# Patient Record
Sex: FEMALE | Race: WHITE
[De-identification: names, ages, dates, MRNs, and addresses within clinical notes are randomized per-mention and may not be internally consistent; named-entity substitution may affect disease eponyms.]

---

## 2019-01-04 ENCOUNTER — HOSPITAL ENCOUNTER (EMERGENCY)
Dept: HOSPITAL 54 - ER | Age: 69
Discharge: HOME | End: 2019-01-04
Payer: MEDICARE

## 2019-01-04 VITALS — BODY MASS INDEX: 26.46 KG/M2 | WEIGHT: 155 LBS | HEIGHT: 64 IN

## 2019-01-04 VITALS — DIASTOLIC BLOOD PRESSURE: 78 MMHG | SYSTOLIC BLOOD PRESSURE: 126 MMHG

## 2019-01-04 DIAGNOSIS — G89.29: ICD-10-CM

## 2019-01-04 DIAGNOSIS — J21.9: Primary | ICD-10-CM
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
## 2019-01-04 DIAGNOSIS — L50.8: ICD-10-CM

## 2019-01-04 DIAGNOSIS — Z88.0: ICD-10-CM

## 2019-01-04 DIAGNOSIS — F10.10: ICD-10-CM

## 2019-01-04 DIAGNOSIS — J45.909: ICD-10-CM

## 2019-01-04 LAB
BASOPHILS # BLD AUTO: 0.1 /CMM (ref 0–0.2)
BASOPHILS NFR BLD AUTO: 1.2 % (ref 0–2)
BUN SERPL-MCNC: 11 MG/DL (ref 7–18)
CALCIUM SERPL-MCNC: 8.7 MG/DL (ref 8.5–10.1)
CHLORIDE SERPL-SCNC: 102 MMOL/L (ref 98–107)
CO2 SERPL-SCNC: 29 MMOL/L (ref 21–32)
CREAT SERPL-MCNC: 0.5 MG/DL (ref 0.6–1.3)
EOSINOPHIL NFR BLD AUTO: 0.1 % (ref 0–6)
GLUCOSE SERPL-MCNC: 91 MG/DL (ref 74–106)
HCT VFR BLD AUTO: 36 % (ref 33–45)
HGB BLD-MCNC: 12.2 G/DL (ref 11.5–14.8)
LYMPHOCYTES NFR BLD AUTO: 2.4 /CMM (ref 0.8–4.8)
LYMPHOCYTES NFR BLD AUTO: 21.3 % (ref 20–44)
MCHC RBC AUTO-ENTMCNC: 34 G/DL (ref 31–36)
MCV RBC AUTO: 83 FL (ref 82–100)
MONOCYTES NFR BLD AUTO: 0.6 /CMM (ref 0.1–1.3)
MONOCYTES NFR BLD AUTO: 5.1 % (ref 2–12)
NEUTROPHILS # BLD AUTO: 8.1 /CMM (ref 1.8–8.9)
NEUTROPHILS NFR BLD AUTO: 72.3 % (ref 43–81)
NT-PROBNP SERPL-MCNC: 1399 PG/ML (ref 0–125)
PLATELET # BLD AUTO: 285 /CMM (ref 150–450)
POTASSIUM SERPL-SCNC: 3.5 MMOL/L (ref 3.5–5.1)
RBC # BLD AUTO: 4.37 MIL/UL (ref 4–5.2)
SODIUM SERPL-SCNC: 138 MMOL/L (ref 136–145)
WBC NRBC COR # BLD AUTO: 11.2 K/UL (ref 4.3–11)

## 2019-01-04 NOTE — NUR
-------------------------------------------------------------------------------

           *** Note undone in EDM - 01/04/19 at 1534 by ELVIS ***            

-------------------------------------------------------------------------------

BIB FRIEND C/O WORSENING SOB ON EXERTION.  STATES SHE'S EXPERIENCED SIMILAR 
CONDITION OFF AND ON BEFORE.  PT IS AOX4, AMBULATORY, VSS, RR EVEN AND 
UNLABORED.  SKIN WARM, DRY, INTACT.  DENIES DIZZINESS, WEAKNESS, N/V.  NO ACUTE 
DISTRESS.  DENIES PAIN.  READY FOR EVAL.

## 2019-01-04 NOTE — NUR
BIB FRIEND C/O WORSENING SOB ON EXERTION.  STATES SHE'S EXPERIENCED SIMILAR 
CONDITION OFF AND ON BEFORE.  PT IS AOX4, AMBULATORY, VSS, RR EVEN AND 
UNLABORED.  SKIN WARM, DRY, INTACT.  DENIES DIZZINESS, WEAKNESS, N/V.  NO ACUTE 
DISTRESS.  DENIES PAIN.  READY FOR EVAL.